# Patient Record
Sex: MALE | URBAN - METROPOLITAN AREA
[De-identification: names, ages, dates, MRNs, and addresses within clinical notes are randomized per-mention and may not be internally consistent; named-entity substitution may affect disease eponyms.]

---

## 2019-10-28 ENCOUNTER — TELEPHONE (OUTPATIENT)
Dept: RADIOLOGY | Facility: CLINIC | Age: 69
End: 2019-10-28

## 2019-10-28 DIAGNOSIS — C22.0 HCC (HEPATOCELLULAR CARCINOMA) (H): Primary | ICD-10-CM

## 2019-10-28 NOTE — TELEPHONE ENCOUNTER
I called and spoke with Isaak.  He is being referred for [facility] by [facility].  He will need updated labs and MRI, but he prefers to have done at the [facility].  [facility] and [facility] will get [facility] scheduled and send the info.  Pt scheduled per his preference for consult 11/20/19, he declined earlier appointment.  SHAWNA Salmeron RN, BSN  Interventional Radiology Nurse Coordinator   Phone:  [phone]